# Patient Record
Sex: FEMALE | Race: WHITE | NOT HISPANIC OR LATINO | Employment: FULL TIME | URBAN - METROPOLITAN AREA
[De-identification: names, ages, dates, MRNs, and addresses within clinical notes are randomized per-mention and may not be internally consistent; named-entity substitution may affect disease eponyms.]

---

## 2017-06-13 ENCOUNTER — OFFICE VISIT (OUTPATIENT)
Dept: NEUROSURGERY | Facility: CLINIC | Age: 47
End: 2017-06-13

## 2017-06-13 ENCOUNTER — TELEPHONE (OUTPATIENT)
Dept: NEUROSURGERY | Facility: CLINIC | Age: 47
End: 2017-06-13

## 2017-06-13 VITALS — BODY MASS INDEX: 38.49 KG/M2 | WEIGHT: 231 LBS | TEMPERATURE: 98.2 F | HEIGHT: 65 IN

## 2017-06-13 DIAGNOSIS — S39.012A LUMBAR STRAIN, INITIAL ENCOUNTER: ICD-10-CM

## 2017-06-13 DIAGNOSIS — M51.36 DISC DEGENERATION, LUMBAR: Primary | ICD-10-CM

## 2017-06-13 PROCEDURE — 99203 OFFICE O/P NEW LOW 30 MIN: CPT | Performed by: PHYSICIAN ASSISTANT

## 2017-06-13 RX ORDER — DICLOFENAC SODIUM 75 MG/1
75 TABLET, DELAYED RELEASE ORAL 2 TIMES DAILY
Qty: 60 TABLET | Refills: 2 | Status: SHIPPED | OUTPATIENT
Start: 2017-06-13

## 2017-06-13 NOTE — PROGRESS NOTES
Patient: Fe Lopez  : 1970  Chart #: [id number]    Date of Service: 2017    CHIEF COMPLAINT: Back pain     History of Present Illness Ms. Lopez is a 47 year old  with a 3-4 week history of pain in the low back that extends up to the bra line.  No inciting incident.  She has tried medicines and time off work but her pain persists. Worse with movement and bending, prolonged sitting, standing, or lying down.  No pain, numbness, or weakness involving her legs or feet. No bowel or bladder difficulties. She cares for her father who is full assistance due to Parkinson's disease.       The following portions of the patient's history were reviewed and updated as appropriate: allergies, current medications, past family history, past medical history, past social history, past surgical history and problem list.    Review of Systems   Constitutional: Positive for activity change. Negative for appetite change, chills, diaphoresis, fatigue, fever and unexpected weight change.   HENT: Negative for congestion, dental problem, drooling, ear discharge, ear pain, facial swelling, hearing loss, mouth sores, nosebleeds, postnasal drip, rhinorrhea, sinus pressure, sneezing, sore throat, tinnitus, trouble swallowing and voice change.    Eyes: Negative for photophobia, pain, discharge, redness, itching and visual disturbance.   Respiratory: Positive for apnea. Negative for cough, choking, chest tightness, shortness of breath, wheezing and stridor.    Cardiovascular: Negative for chest pain, palpitations and leg swelling.   Gastrointestinal: Negative for abdominal distention, abdominal pain, anal bleeding, blood in stool, constipation, diarrhea, nausea, rectal pain and vomiting.   Endocrine: Negative for cold intolerance, heat intolerance, polydipsia, polyphagia and polyuria.   Genitourinary: Negative for decreased urine volume, difficulty urinating, dysuria, enuresis, flank pain, frequency, genital sores,  "hematuria and urgency.   Musculoskeletal: Positive for back pain and neck pain. Negative for arthralgias, gait problem, joint swelling, myalgias and neck stiffness.   Skin: Negative for color change, pallor, rash and wound.   Allergic/Immunologic: Negative for environmental allergies, food allergies and immunocompromised state.   Neurological: Positive for numbness and headaches. Negative for dizziness, tremors, seizures, syncope, facial asymmetry, speech difficulty, weakness and light-headedness.   Hematological: Negative for adenopathy. Does not bruise/bleed easily.   Psychiatric/Behavioral: Negative for agitation, behavioral problems, confusion, decreased concentration, dysphoric mood, hallucinations, self-injury, sleep disturbance and suicidal ideas. The patient is not nervous/anxious and is not hyperactive.    All other systems reviewed and are negative.      Objective   Vital Signs: Temperature 98.2 °F (36.8 °C), temperature source Temporal Artery , height 64.5\" (163.8 cm), weight 231 lb (105 kg).  Physical Exam   Constitutional: She appears well-developed and well-nourished. No distress.   HENT:   Head: Normocephalic and atraumatic.   Eyes: EOM are normal. Pupils are equal, round, and reactive to light.   Cardiovascular: Normal rate, regular rhythm and normal heart sounds.    Pulmonary/Chest: Effort normal and breath sounds normal.   Psychiatric: She has a normal mood and affect. Her behavior is normal. Thought content normal.   Nursing note and vitals reviewed.  Musculoskeletal: no tenderness in the lumbar area.   Strength is intact in upper and lower extremities to direct testing.  Muscle is normal throughout.  Station and gait are normal.  Straight leg raising is negative.  Neurologic:  Gait: Able to tandem walk without difficulty.  Coordination is intact.  Finger to nose, heel-to-shin, rapid alternating movements.  Deep tendon reflexes: 1+ and symmetrical.  Sensation is intact to light touch " throughout.  Patient is oriented to person, place, and time.  Story sign is negative.     Radiographic Imaging: Plain films of the lumbar spine reveal mild degenerative changes particularly at L4-5, L5-S1 there is some disc space narrowing. Alignment is good in the lateral view. Normal vertebral heights.     Assessment/Plan    Diagnosis: Mechanical back pain     Medical Decision Making: Ms. Lopez has a few week history of some axial back pain.  She cares for her father who is fully dependent and I think this set her symptoms off. I would like her to try some formal physical therapy for a few weeks. She has some financial concerns about this but will look into it. I have also provided an anti-inflammatory to take daily for a month or so. In the absence of any radicular complaints I am hesitant to move forward with an MRI; however, if her symptoms worsen she will let our office know and we may choose to set this up. She will call if she has any questions or concerns.               Micheline Staton PA-C  Patient Care Team:  Jarred Brink MD as PCP - General (Adolescent Medicine)  Jarred Brink MD as Referring Physician (Adolescent Medicine)

## 2017-06-13 NOTE — TELEPHONE ENCOUNTER
Provider: Brionna   Caller: Fe  Time of call: 2:36pm    Phone #:  [phone]  Surgery:  NA  Surgery Date:  NA  Last visit:  6/13/2017    Next visit: NA        Reason for call:         Pt would like to know if she can delay going back to work until after July 4th.  She was originally suppose to go back to work in 2 wks but her work is having a shut down and lay off during that time and she is afraid if she goes back it will mess up her job. She also thinks it will give her more time to heal.